# Patient Record
Sex: FEMALE
[De-identification: names, ages, dates, MRNs, and addresses within clinical notes are randomized per-mention and may not be internally consistent; named-entity substitution may affect disease eponyms.]

---

## 2021-08-24 ENCOUNTER — RX ONLY (RX ONLY)
Age: 36
End: 2021-08-24

## 2021-08-24 ENCOUNTER — *OTHER (OUTPATIENT)
Dept: URBAN - METROPOLITAN AREA CLINIC 23 | Facility: CLINIC | Age: 36
Setting detail: DERMATOLOGY
End: 2021-08-24

## 2021-08-24 DIAGNOSIS — C44.42 SQUAMOUS CELL CARCINOMA OF SKIN OF SCALP AND NECK: ICD-10-CM

## 2021-08-24 PROCEDURE — 17110 DESTRUCT B9 LESION 1-14: CPT

## 2021-08-24 PROCEDURE — 99214 OFFICE O/P EST MOD 30 MIN: CPT

## 2021-08-24 RX ORDER — TRETINOIN 0.25 MG/G
CREAM TOPICAL
Qty: 45 | Refills: 2
Start: 2021-08-24

## 2021-10-28 ENCOUNTER — *OTHER (OUTPATIENT)
Dept: URBAN - METROPOLITAN AREA CLINIC 23 | Facility: CLINIC | Age: 36
Setting detail: DERMATOLOGY
End: 2021-10-28

## 2021-10-28 DIAGNOSIS — D48.5 NEOPLASM OF UNCERTAIN BEHAVIOR OF SKIN: ICD-10-CM

## 2021-10-28 PROCEDURE — 17110 DESTRUCT B9 LESION 1-14: CPT

## 2023-05-23 ENCOUNTER — APPOINTMENT (OUTPATIENT)
Dept: URBAN - METROPOLITAN AREA CLINIC 196 | Age: 38
Setting detail: DERMATOLOGY
End: 2023-05-23

## 2023-05-23 DIAGNOSIS — L65.0 TELOGEN EFFLUVIUM: ICD-10-CM

## 2023-05-23 PROCEDURE — 99214 OFFICE O/P EST MOD 30 MIN: CPT

## 2023-05-23 PROCEDURE — OTHER ORDER TESTS: OTHER

## 2023-05-23 PROCEDURE — OTHER COUNSELING: OTHER

## 2023-05-23 ASSESSMENT — LOCATION SIMPLE DESCRIPTION DERM: LOCATION SIMPLE: SCALP

## 2023-05-23 ASSESSMENT — LOCATION DETAILED DESCRIPTION DERM
LOCATION DETAILED: RIGHT CENTRAL PARIETAL SCALP
LOCATION DETAILED: LEFT CENTRAL PARIETAL SCALP

## 2023-05-23 ASSESSMENT — LOCATION ZONE DERM: LOCATION ZONE: SCALP

## 2023-05-23 NOTE — PROCEDURE: COUNSELING
Detail Level: Zone
Patient Specific Counseling (Will Not Stick From Patient To Patient): PT SAID IT STARTED 8 MONTHS AGO. DID HAVE COVID BEFORE THAT. DOES ENDORSE LIFE STRESSORS. DOES FEEL LIKE SHE WAS SHEDDING MORE AND NOW IT'S SLOWING DOWN A BIT.\\nHAIR PULL TEST WAS NEGATIVE. \\n\\nHANDOUT GIVEN TO PT, AND WILL ORDER BLOODWORK. PT DOES HAVE HEMOCHROMASTOSIS SO WILL NOT ORDER IRON/FERRITIN AS HAVING THAT MONITORED. \\n\\nOF NOTE, PT IS PLANNING ON PREGNANCY. \\n\\nTelogen Effluvium (TE): usually improves within months after stressor passes\\nIndividual hair follicles cycle through 3 phases: anagen (growth phase), catagen, and telogen (rest phase, with about 100-200 hairs shed daily). What governs the duration of each cycle is not well understood. Hair growth of each hair follicle is independent of other hair follicles, resulting in a uniform density of hair at all times.  However, various metabolic alterations and stresses can adjust this biologic clock within hair follicles such that an abnormally large number of hairs enter the telogen phase simultaneously, leading to telogen effluvium.\\nCommon causes of TE:\\n- stress\\n- postsurgical\\n- post-fever\\n- severe infection\\n- severe chronic illness (ie HIV, lupus)\\n- severe or prolonged psychological stress\\n- thyroid dysfunction\\n- crash or liquid protein diets; starvation/malnutrition\\n- medications (stopping OCPs, retinoids, heparin, seizure medications, beta blockers)\\n- idiopathic (no cause identified)\\nMost cases of TE spontaneously resolve within 6-12 months, and hair regrows. Complete hair loss is not expected, and should you experience this, you should arrange for a clinical re-evaluation.